# Patient Record
Sex: MALE | Race: WHITE
[De-identification: names, ages, dates, MRNs, and addresses within clinical notes are randomized per-mention and may not be internally consistent; named-entity substitution may affect disease eponyms.]

---

## 2018-03-26 ENCOUNTER — APPOINTMENT (OUTPATIENT)
Dept: ORTHOPEDIC SURGERY | Facility: CLINIC | Age: 53
End: 2018-03-26
Payer: COMMERCIAL

## 2018-03-26 VITALS
HEART RATE: 71 BPM | WEIGHT: 144 LBS | HEIGHT: 66 IN | DIASTOLIC BLOOD PRESSURE: 71 MMHG | BODY MASS INDEX: 23.14 KG/M2 | SYSTOLIC BLOOD PRESSURE: 107 MMHG

## 2018-03-26 DIAGNOSIS — M51.36 OTHER INTERVERTEBRAL DISC DEGENERATION, LUMBAR REGION: ICD-10-CM

## 2018-03-26 PROCEDURE — 72170 X-RAY EXAM OF PELVIS: CPT | Mod: 59

## 2018-03-26 PROCEDURE — 99203 OFFICE O/P NEW LOW 30 MIN: CPT | Mod: 25

## 2018-03-26 PROCEDURE — 72110 X-RAY EXAM L-2 SPINE 4/>VWS: CPT

## 2018-03-26 PROCEDURE — 96372 THER/PROPH/DIAG INJ SC/IM: CPT

## 2018-03-26 RX ORDER — MELOXICAM 15 MG/1
15 TABLET ORAL
Qty: 14 | Refills: 0 | Status: ACTIVE | COMMUNITY
Start: 2018-03-26 | End: 1900-01-01

## 2018-03-26 RX ORDER — TIZANIDINE 4 MG/1
4 TABLET ORAL
Qty: 30 | Refills: 0 | Status: ACTIVE | COMMUNITY
Start: 2018-03-26 | End: 1900-01-01

## 2018-03-26 RX ADMIN — KETOROLAC TROMETHAMINE 0 MG/ML: 30 INJECTION, SOLUTION INTRAMUSCULAR; INTRAVENOUS at 00:00

## 2018-03-26 NOTE — DISCUSSION/SUMMARY
[Medication Risks Reviewed] : Medication risks reviewed [de-identified] : The patient is primarily axial low back pain with some radiation into his legs. 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 Offered him an injection of Toradol for his pain and he wanted to proceed. Under sterile conditions 30 mg of Toradol was administered intramuscularly by the LPN without incident. Prescribed meloxicam and tizanidine for symptomatic relief. Also prescribed physical therapy to start in his symptoms improved and her trial of lumbosacral orthosis for now.He may take some time off from work as needed for his back pain.\par \par The patient was educated regarding their condition, treatment options as well as prescribed course of treatment. \par Risks and benefits as well as alternatives to the proposed treatment were also provided to the patient \par They were given the opportunity to have all their questions answered to their satisfaction.\par \par Vital signs were reviewed with the patient and the patient was instructed to followup with their primary care provider for further management.\par \par Healthy lifestyle recommendations were also made including a tobacco free lifestyle, proper diet, and weight control.

## 2018-03-26 NOTE — CONSULT LETTER
[Dear  ___] : Dear  [unfilled], [I had the pleasure of evaluating your patient, [unfilled].] : I had the pleasure of evaluating your patient, [unfilled]. [FreeTextEntry2] : Carmelita Stayleck [FreeTextEntry1] : Thank you for this referral. I have enclosed my note for your review. Please feel free to contact my office if you have additional questions regarding this patient.\par \par Regards,\par Owen Becerril MD, FACS, FAAOS\par \par  of Orthopaedic Surgery\par [facility] [employer]\par Spinal Reconstruction Surgery\par Minimally Invasive Spinal Surgery\par [facility]

## 2018-03-26 NOTE — PHYSICAL EXAM
[Limited] : is limited [Painful] : is painful [Antalgic] : antalgic [LE] : Sensory: Intact in bilateral lower extremities [0] : left patella 0 [1+] : left ankle jerk 1+ [DP] : dorsalis pedis 2+ and symmetric bilaterally [PT] : posterior tibial 2+ and symmetric bilaterally [Poor Appearance] : well-appearing [Acute Distress] : not in acute distress [Obese] : not obese [Abl Mood] : in a normal mood [Abl Affect] : with normal affect [Poor Coordination] : normal coordination [Disorientation] : oriented x 3 [Plantar Reflex Right Only] : absent on the right [Plantar Reflex Left Only] : absent on the left [DTR Reflexes Clonus Of Right Ankle (___ Beats)] : absent on the right [DTR Reflexes Clonus Of Left Ankle (___ Beats)] : absent on the left [FreeTextEntry2] : The pt is awake, alert and oriented to self, place and time, is uncomfortable and in no acute distress. Inspection of neck, back and lower extremities bilaterally reveals no rashes or ecchymotic lesions.  . There is no tenderness over the cervical, thoracic spine, or the  upper and lower extremities musculature. midline lumbar and paraspinallumbar tendernesnoted. There is no sacroiliac tenderness. No greater trochanteric tenderness bilaterally. No atrophy or abnormal movements noted in the upper or lower extremities. There is no swelling noted in the upper or lower extremities bilaterally. No cervical lymphadenopathy noted anteriorly. No joint laxity noted in the upper and lower extremity joints bilaterally.\par . Hip range of motion is degrees internal rotation 30° external rotation without pain. Full range of motion of the shoulders bilaterally with no significant pain\par Negative straight leg raise to 45° in the sitting position bilaterally. There is no groin pain with hip internal rotation and a negative DIAZ test bilaterally.  [de-identified] : He can forward flex to his knees and extend 30° with increased pain with extension [de-identified] : seen with his wife\par leans to the left [de-identified] : 4 views lumbar spine obtained today demonstrate trunk shift of the right. On the lateral projection normal lumbar lordosis. Small anterior osteophytes at L4 and L3. Between flexion-extension no dynamic instability. No acute fractures.\par \par AP pelvis demonstrates minimal femoral acetabular impingement changes on the right side of the femoral head and neck junction. No acute fractures. In degeneration noted in the hip joint.

## 2018-03-26 NOTE — HISTORY OF PRESENT ILLNESS
[6] : currently ~his/her~ pain is 6 out of 10 [Prolonged Sitting] : worsened by prolonged sitting [Prolonged Standing] : worsened by prolonged standing [Ice] : relieved by ice [Rest] : relieved by rest [Joint Pain] : joint pain [Joint Stiffness] : joint stiffness [Sleep Disturbances] : sleep disturbances [Pain] : pain [Improving] : improving [___ days] : [unfilled] day(s) ago [Standing] : worsened by standing [All Other ROS Normal] : All other review of systems are negative except as noted [All Hx] : past medical, family, and social [All] : medication and allergy [Chills] : no chills [Fever] : no fever [FreeTextEntry1] : DOI 3/24/18 Left lower back into left hip pain [FreeTextEntry2] : On Saturday 3/24/18 leaning forward using chain saw felt something pull in his left lower back hip area. Patient not medically treated took ibuprofen aleve and used ice.  \par No prior history of similar pain.\par Works in [employer] [de-identified] : coughing  sneezing twisting  [de-identified] : aleve ibuprofen

## 2018-04-05 RX ORDER — KETOROLAC TROMETHAMINE 30 MG/ML
30 INJECTION, SOLUTION INTRAMUSCULAR; INTRAVENOUS
Qty: 1 | Refills: 0 | Status: COMPLETED | OUTPATIENT
Start: 2018-03-26

## 2023-02-08 ENCOUNTER — APPOINTMENT (OUTPATIENT)
Dept: UROLOGY | Facility: CLINIC | Age: 58
End: 2023-02-08

## 2023-11-27 ENCOUNTER — APPOINTMENT (OUTPATIENT)
Dept: HEART AND VASCULAR | Facility: CLINIC | Age: 58
End: 2023-11-27
Payer: COMMERCIAL

## 2023-11-27 PROCEDURE — 99205 OFFICE O/P NEW HI 60 MIN: CPT
